# Patient Record
Sex: FEMALE | Race: WHITE | URBAN - METROPOLITAN AREA
[De-identification: names, ages, dates, MRNs, and addresses within clinical notes are randomized per-mention and may not be internally consistent; named-entity substitution may affect disease eponyms.]

---

## 2018-03-16 RX ORDER — PHENYLEPHRINE HYDROCHLORIDE 25 MG/ML
1 SOLUTION/ DROPS OPHTHALMIC ONCE
Status: CANCELLED | OUTPATIENT
Start: 2018-03-16 | End: 2018-03-16

## 2018-06-19 RX ORDER — FLUTICASONE PROPIONATE 50 MCG
1 SPRAY, SUSPENSION (ML) NASAL DAILY
COMMUNITY

## 2018-06-19 RX ORDER — ELECTROLYTES/DEXTROSE
1 SOLUTION, ORAL ORAL DAILY
COMMUNITY

## 2018-06-19 RX ORDER — TIMOLOL MALEATE 5 MG/ML
1 SOLUTION/ DROPS OPHTHALMIC 2 TIMES DAILY
COMMUNITY

## 2018-06-19 RX ORDER — NAPROXEN SODIUM 220 MG/1
1 TABLET ORAL DAILY
COMMUNITY

## 2018-06-19 RX ORDER — FLUOCINONIDE TOPICAL SOLUTION USP, 0.05% 0.5 MG/ML
SOLUTION TOPICAL 2 TIMES DAILY
COMMUNITY

## 2018-06-20 ENCOUNTER — ANESTHESIA EVENT (OUTPATIENT)
Dept: SURGERY | Facility: CLINIC | Age: 68
End: 2018-06-20
Payer: MEDICARE

## 2018-06-20 ENCOUNTER — HOSPITAL ENCOUNTER (OUTPATIENT)
Facility: CLINIC | Age: 68
Discharge: HOME | End: 2018-06-20
Attending: OPHTHALMOLOGY | Admitting: OPHTHALMOLOGY
Payer: MEDICARE

## 2018-06-20 ENCOUNTER — ANESTHESIA (OUTPATIENT)
Dept: SURGERY | Facility: CLINIC | Age: 68
End: 2018-06-20
Payer: MEDICARE

## 2018-06-20 VITALS
BODY MASS INDEX: 26.68 KG/M2 | HEIGHT: 62 IN | RESPIRATION RATE: 14 BRPM | WEIGHT: 145 LBS | OXYGEN SATURATION: 99 % | SYSTOLIC BLOOD PRESSURE: 135 MMHG | DIASTOLIC BLOOD PRESSURE: 81 MMHG | TEMPERATURE: 97 F

## 2018-06-20 DIAGNOSIS — H50.011 ESOTROPIA OF RIGHT EYE: Primary | ICD-10-CM

## 2018-06-20 PROCEDURE — 25000125 ZZHC RX 250: Performed by: NURSE ANESTHETIST, CERTIFIED REGISTERED

## 2018-06-20 PROCEDURE — 71000004 ZZH RECOVERY EYE PHASE 1 LEVEL 1 FIRST HR: Performed by: OPHTHALMOLOGY

## 2018-06-20 PROCEDURE — 25000128 H RX IP 250 OP 636: Performed by: ANESTHESIOLOGY

## 2018-06-20 PROCEDURE — 36000101 ZZH EYE SURGERY LEVEL 3 1ST 30 MIN: Performed by: OPHTHALMOLOGY

## 2018-06-20 PROCEDURE — 25000125 ZZHC RX 250: Performed by: ANESTHESIOLOGY

## 2018-06-20 PROCEDURE — 37000009 ZZH ANESTHESIA TECHNICAL FEE, EACH ADDTL 15 MIN: Performed by: OPHTHALMOLOGY

## 2018-06-20 PROCEDURE — 40000170 ZZH STATISTIC PRE-PROCEDURE ASSESSMENT II: Performed by: OPHTHALMOLOGY

## 2018-06-20 PROCEDURE — 25000128 H RX IP 250 OP 636: Performed by: NURSE ANESTHETIST, CERTIFIED REGISTERED

## 2018-06-20 PROCEDURE — 25000125 ZZHC RX 250: Performed by: OPHTHALMOLOGY

## 2018-06-20 PROCEDURE — 36000102 ZZH EYE SURGERY LEVEL 3 EA 15 ADDTL MIN: Performed by: OPHTHALMOLOGY

## 2018-06-20 PROCEDURE — 25000566 ZZH SEVOFLURANE, EA 15 MIN: Performed by: OPHTHALMOLOGY

## 2018-06-20 PROCEDURE — 37000008 ZZH ANESTHESIA TECHNICAL FEE, 1ST 30 MIN: Performed by: OPHTHALMOLOGY

## 2018-06-20 PROCEDURE — 27210794 ZZH OR GENERAL SUPPLY STERILE: Performed by: OPHTHALMOLOGY

## 2018-06-20 PROCEDURE — 71000028 ZZH EYE RECOVERY PHASE 2 EACH 15 MINS: Performed by: OPHTHALMOLOGY

## 2018-06-20 PROCEDURE — 25000128 H RX IP 250 OP 636: Performed by: OPHTHALMOLOGY

## 2018-06-20 RX ORDER — MEPERIDINE HYDROCHLORIDE 25 MG/ML
12.5 INJECTION INTRAMUSCULAR; INTRAVENOUS; SUBCUTANEOUS
Status: CANCELLED | OUTPATIENT
Start: 2018-06-20

## 2018-06-20 RX ORDER — SODIUM CHLORIDE, SODIUM LACTATE, POTASSIUM CHLORIDE, CALCIUM CHLORIDE 600; 310; 30; 20 MG/100ML; MG/100ML; MG/100ML; MG/100ML
INJECTION, SOLUTION INTRAVENOUS CONTINUOUS
Status: CANCELLED | OUTPATIENT
Start: 2018-06-20

## 2018-06-20 RX ORDER — PROPOFOL 10 MG/ML
INJECTION, EMULSION INTRAVENOUS PRN
Status: DISCONTINUED | OUTPATIENT
Start: 2018-06-20 | End: 2018-06-20

## 2018-06-20 RX ORDER — NALOXONE HYDROCHLORIDE 0.4 MG/ML
.1-.4 INJECTION, SOLUTION INTRAMUSCULAR; INTRAVENOUS; SUBCUTANEOUS
Status: CANCELLED | OUTPATIENT
Start: 2018-06-20 | End: 2018-06-21

## 2018-06-20 RX ORDER — HYDROMORPHONE HYDROCHLORIDE 1 MG/ML
.3-.5 INJECTION, SOLUTION INTRAMUSCULAR; INTRAVENOUS; SUBCUTANEOUS EVERY 10 MIN PRN
Status: CANCELLED | OUTPATIENT
Start: 2018-06-20

## 2018-06-20 RX ORDER — LIDOCAINE HYDROCHLORIDE 20 MG/ML
INJECTION, SOLUTION INFILTRATION; PERINEURAL PRN
Status: DISCONTINUED | OUTPATIENT
Start: 2018-06-20 | End: 2018-06-20

## 2018-06-20 RX ORDER — ONDANSETRON 2 MG/ML
4 INJECTION INTRAMUSCULAR; INTRAVENOUS EVERY 30 MIN PRN
Status: CANCELLED | OUTPATIENT
Start: 2018-06-20

## 2018-06-20 RX ORDER — SODIUM CHLORIDE, SODIUM LACTATE, POTASSIUM CHLORIDE, CALCIUM CHLORIDE 600; 310; 30; 20 MG/100ML; MG/100ML; MG/100ML; MG/100ML
INJECTION, SOLUTION INTRAVENOUS CONTINUOUS
Status: DISCONTINUED | OUTPATIENT
Start: 2018-06-20 | End: 2018-06-20 | Stop reason: HOSPADM

## 2018-06-20 RX ORDER — DEXAMETHASONE SODIUM PHOSPHATE 4 MG/ML
INJECTION, SOLUTION INTRA-ARTICULAR; INTRALESIONAL; INTRAMUSCULAR; INTRAVENOUS; SOFT TISSUE PRN
Status: DISCONTINUED | OUTPATIENT
Start: 2018-06-20 | End: 2018-06-20

## 2018-06-20 RX ORDER — NEOMYCIN POLYMYXIN B SULFATES AND DEXAMETHASONE 3.5; 10000; 1 MG/ML; [USP'U]/ML; MG/ML
1 SUSPENSION/ DROPS OPHTHALMIC 3 TIMES DAILY
Qty: 1 BOTTLE | Refills: 1 | Status: SHIPPED | OUTPATIENT
Start: 2018-06-20 | End: 2018-06-27

## 2018-06-20 RX ORDER — NEOMYCIN SULFATE, POLYMYXIN B SULFATE AND DEXAMETHASONE 3.5; 10000; 1 MG/ML; [USP'U]/ML; MG/ML
SUSPENSION/ DROPS OPHTHALMIC PRN
Status: DISCONTINUED | OUTPATIENT
Start: 2018-06-20 | End: 2018-06-20 | Stop reason: HOSPADM

## 2018-06-20 RX ORDER — ONDANSETRON 2 MG/ML
INJECTION INTRAMUSCULAR; INTRAVENOUS PRN
Status: DISCONTINUED | OUTPATIENT
Start: 2018-06-20 | End: 2018-06-20

## 2018-06-20 RX ORDER — HYDROCODONE BITARTRATE AND ACETAMINOPHEN 5; 325 MG/1; MG/1
1 TABLET ORAL EVERY 4 HOURS PRN
Qty: 12 TABLET | Refills: 0 | Status: SHIPPED | OUTPATIENT
Start: 2018-06-20

## 2018-06-20 RX ORDER — FENTANYL CITRATE 50 UG/ML
25-50 INJECTION, SOLUTION INTRAMUSCULAR; INTRAVENOUS
Status: CANCELLED | OUTPATIENT
Start: 2018-06-20

## 2018-06-20 RX ORDER — BALANCED SALT SOLUTION 6.4; .75; .48; .3; 3.9; 1.7 MG/ML; MG/ML; MG/ML; MG/ML; MG/ML; MG/ML
SOLUTION OPHTHALMIC PRN
Status: DISCONTINUED | OUTPATIENT
Start: 2018-06-20 | End: 2018-06-20 | Stop reason: HOSPADM

## 2018-06-20 RX ORDER — FENTANYL CITRATE 50 UG/ML
INJECTION, SOLUTION INTRAMUSCULAR; INTRAVENOUS PRN
Status: DISCONTINUED | OUTPATIENT
Start: 2018-06-20 | End: 2018-06-20

## 2018-06-20 RX ORDER — PROPOFOL 10 MG/ML
INJECTION, EMULSION INTRAVENOUS CONTINUOUS PRN
Status: DISCONTINUED | OUTPATIENT
Start: 2018-06-20 | End: 2018-06-20

## 2018-06-20 RX ORDER — HYDROCODONE BITARTRATE AND ACETAMINOPHEN 5; 325 MG/1; MG/1
1 TABLET ORAL EVERY 6 HOURS PRN
Qty: 10 TABLET | Refills: 0 | Status: SHIPPED | OUTPATIENT
Start: 2018-06-20

## 2018-06-20 RX ORDER — DEXAMETHASONE SODIUM PHOSPHATE 4 MG/ML
INJECTION, SOLUTION INTRA-ARTICULAR; INTRALESIONAL; INTRAMUSCULAR; INTRAVENOUS; SOFT TISSUE PRN
Status: DISCONTINUED | OUTPATIENT
Start: 2018-06-20 | End: 2018-06-20 | Stop reason: HOSPADM

## 2018-06-20 RX ORDER — PHENYLEPHRINE HYDROCHLORIDE 25 MG/ML
SOLUTION/ DROPS OPHTHALMIC PRN
Status: DISCONTINUED | OUTPATIENT
Start: 2018-06-20 | End: 2018-06-20 | Stop reason: HOSPADM

## 2018-06-20 RX ORDER — ONDANSETRON 4 MG/1
4 TABLET, ORALLY DISINTEGRATING ORAL EVERY 30 MIN PRN
Status: CANCELLED | OUTPATIENT
Start: 2018-06-20

## 2018-06-20 RX ADMIN — PROPOFOL 100 MCG/KG/MIN: 10 INJECTION, EMULSION INTRAVENOUS at 13:01

## 2018-06-20 RX ADMIN — LIDOCAINE HYDROCHLORIDE 60 MG: 20 INJECTION, SOLUTION INFILTRATION; PERINEURAL at 12:58

## 2018-06-20 RX ADMIN — SODIUM CHLORIDE, POTASSIUM CHLORIDE, SODIUM LACTATE AND CALCIUM CHLORIDE: 600; 310; 30; 20 INJECTION, SOLUTION INTRAVENOUS at 11:36

## 2018-06-20 RX ADMIN — LIDOCAINE HYDROCHLORIDE 1 ML: 10 INJECTION, SOLUTION EPIDURAL; INFILTRATION; INTRACAUDAL; PERINEURAL at 11:35

## 2018-06-20 RX ADMIN — ONDANSETRON 4 MG: 2 INJECTION INTRAMUSCULAR; INTRAVENOUS at 15:07

## 2018-06-20 RX ADMIN — DEXAMETHASONE SODIUM PHOSPHATE 4 MG: 4 INJECTION, SOLUTION INTRA-ARTICULAR; INTRALESIONAL; INTRAMUSCULAR; INTRAVENOUS; SOFT TISSUE at 13:25

## 2018-06-20 RX ADMIN — ONDANSETRON 4 MG: 2 INJECTION INTRAMUSCULAR; INTRAVENOUS at 12:58

## 2018-06-20 RX ADMIN — MIDAZOLAM 2 MG: 1 INJECTION INTRAMUSCULAR; INTRAVENOUS at 12:57

## 2018-06-20 RX ADMIN — FENTANYL CITRATE 50 MCG: 50 INJECTION, SOLUTION INTRAMUSCULAR; INTRAVENOUS at 12:58

## 2018-06-20 RX ADMIN — PROPOFOL 120 MG: 10 INJECTION, EMULSION INTRAVENOUS at 12:58

## 2018-06-20 ASSESSMENT — ENCOUNTER SYMPTOMS: SEIZURES: 0

## 2018-06-20 ASSESSMENT — COPD QUESTIONNAIRES: COPD: 0

## 2018-06-20 NOTE — ANESTHESIA CARE TRANSFER NOTE
Patient: Clementine Quick    Procedure(s):  RIGHT EYE STRABISMUS REPAIR WITH ADJUSTABLE SUTURE - Wound Class: I-Clean    Diagnosis: RIGHT EYE MENOCCULAR ESOTROPIA  Diagnosis Additional Information: No value filed.    Anesthesia Type:   General, LMA     Note:  Airway :Face Mask  Patient transferred to:PACU  Comments: Transferred to [facility] recovery room in recliner with armrests up, spontaneous respirations, O2 saturation maintained greater than 98% with oxygen via facemask. All monitors and alarms on and functioning, clinically stable vital signs. Report given to recovery RN and questions answered. Patient alert and following verbal directions.Handoff Report: Identifed the Patient, Identified the Reponsible Provider, Reviewed the pertinent medical history, Discussed the surgical course, Reviewed Intra-OP anesthesia mangement and issues during anesthesia, Set expectations for post-procedure period and Allowed opportunity for questions and acknowledgement of understanding      Vitals: (Last set prior to Anesthesia Care Transfer)    CRNA VITALS  6/20/2018 1325 - 6/20/2018 1400      6/20/2018             NIBP: (!)  126/98    Pulse: 107    NIBP Mean: 112    SpO2: 99 %    Resp Rate (observed): 10                Electronically Signed By: NILSA Ray CRNA  June 20, 2018  2:00 PM

## 2018-06-20 NOTE — DISCHARGE INSTRUCTIONS
[facility] Discharge  Instructions  Anesthesia ([facility])   Adult Discharge Instructions (General)    For 24 hours after surgery    1. Get plenty of rest.  Make arrangements to have a responsible adult stay with you for at least 24 hours.  2. Do not drive or use heavy equipment for 24 hours.    3. Do not drink alcohol for 24 hours.  4. Do not sign legal documents or make important decisions for 24 hours.  5. Avoid strenuous or risky activities. You may feel lightheaded.  If so, sit for a few minutes before standing.  Have someone help you get up.   6. General anesthesia patients should drink only clear liquids (apple juice, ginger ale, broth or 7-Up). Be sure to drink enough fluids.  Move to a regular diet as you feel able.  7. Any questions of medical nature, call your physician.    [facility]  Discharge Instructions after Strabismus Repair-Adult  Rashard Espinosa M.D.          You may be nauseated after surgery.  Lying down or sitting quietly often helps to relieve this.      Try sips of clear liquids or popsicles frequently for the first few hours after arriving home.  If you tolerate liquids and are not nauseated, add soft and bland foods to the diet.  Advance to regular diet as tolerated.  Return to clear liquids if you cannot tolerate solid foods.      Avoid rubbing the eyes.  Eye redness is normal and typically is worse the        second day after surgery. This gradually disappears in one to two weeks.       Do not use tap water near the eyes for one week.  Use a dry cloth or sterile saline from the pharmacy, if needed.      Restrict activity for three days.  NO heavy lifting (greater than 20 pounds) or swimming for one week (or until approved by your doctor).      Maxitrol drops/ointment has been prescribed.  Starting tomorrow morning, apply as directed three times a day until further notice.    Vicodin or other oral pain medications have been  written.  Most patients do not need to fill this prescriptions.  However, take the prescription home and fill if necessary.    Your post-operative appointment is typically in 1-2 days if adjustable sutures were used. If not, the appointment is usually within two weeks.      [facility] Phone Number: [phone]

## 2018-06-20 NOTE — IP AVS SNAPSHOT
MRN:[id number]                      After Visit Summary   6/20/2018    Clementine Quick    MRN: [id number]           Thank you!     Thank you for choosing [facility] for your care. Our goal is always to provide you with excellent care. Hearing back from our patients is one way we can continue to improve our services. Please take a few minutes to complete the written survey that you may receive in the mail after you visit with us. Thank you!        Patient Information     Date Of Birth          1950        About your hospital stay     You were admitted on:  June 20, 2018 You last received care in the:  [facility]    You were discharged on:  June 20, 2018       Who to Call     For medical emergencies, please call 911.  For non-urgent questions about your medical care, please call your primary care provider or clinic, [phone]  For questions related to your surgery, please call your surgery clinic        Attending Provider     Provider Specialty    Rashard Espinosa MD Ophthalmology       Primary Care Provider Office Phone # Fax #    Nas Barcenas [phone]      After Care Instructions     Eye drops as prescribed by physician.  Start drops today unless told otherwise by the physician           May use Tylenol or Advil for pain as directed by the physician           Notify Physician if you have severe headache or nausea           Remove patch per physician instruction           Return to clinic as instructed by physician           Wear eye shield or patch as directed                 Further instructions from your care team       [facility] Discharge  Instructions  Anesthesia ([facility])   Adult Discharge Instructions (General)    For 24 hours after surgery    1. Get plenty of rest.  Make arrangements to have a responsible adult stay with you for at least 24 hours.  2. Do not drive or use heavy equipment for 24  hours.    3. Do not drink alcohol for 24 hours.  4. Do not sign legal documents or make important decisions for 24 hours.  5. Avoid strenuous or risky activities. You may feel lightheaded.  If so, sit for a few minutes before standing.  Have someone help you get up.   6. General anesthesia patients should drink only clear liquids (apple juice, ginger ale, broth or 7-Up). Be sure to drink enough fluids.  Move to a regular diet as you feel able.  7. Any questions of medical nature, call your physician.    [facility] [place]  Discharge Instructions after Strabismus Repair-Adult  Rashard Espinosa M.D.          You may be nauseated after surgery.  Lying down or sitting quietly often helps to relieve this.      Try sips of clear liquids or popsicles frequently for the first few hours after arriving home.  If you tolerate liquids and are not nauseated, add soft and bland foods to the diet.  Advance to regular diet as tolerated.  Return to clear liquids if you cannot tolerate solid foods.      Avoid rubbing the eyes.  Eye redness is normal and typically is worse the        second day after surgery. This gradually disappears in one to two weeks.       Do not use tap water near the eyes for one week.  Use a dry cloth or sterile saline from the pharmacy, if needed.      Restrict activity for three days.  NO heavy lifting (greater than 20 pounds) or swimming for one week (or until approved by your doctor).      Maxitrol drops/ointment has been prescribed.  Starting tomorrow morning, apply as directed three times a day until further notice.    Vicodin or other oral pain medications have been written.  Most patients do not need to fill this prescriptions.  However, take the prescription home and fill if necessary.    Your post-operative appointment is typically in 1-2 days if adjustable sutures were used. If not, the appointment is usually within two weeks.      [facility] Phone Number: [phone]         "                                                                                                                        Pending Results     No orders found from 2018 to 2018.            Admission Information     Date & Time Provider Department Dept. Phone    2018 Rashard Espinosa MD [facility] [phone]      Your Vitals Were     Blood Pressure Temperature Respirations Height Weight Pulse Oximetry    135/81 97  F (36.1  C) (Temporal) 14 1.575 m (5' 2\") 65.8 kg (145 lb) 99%    BMI (Body Mass Index)                   26.52 kg/m2           [employer] Information     [employer] lets you send messages to your doctor, view your test results, renew your prescriptions, schedule appointments and more. To sign up, go to [email or web address] . Click on \"Log in\" on the left side of the screen, which will take you to the Welcome page. Then click on \"Sign up Now\" on the right side of the page.     You will be asked to enter the access code listed below, as well as some personal information. Please follow the directions to create your username and password.     Your access code is: [email or web address]  Expires: 2018  2:48 PM     Your access code will  in 90 days. If you need help or a new code, please call your [place] clinic or [phone].        Care EveryWhere ID     This is your Care EveryWhere ID. This could be used by other organizations to access your [place] medical records  [id number]        Equal Access to Services     RENÉ CHEN AH: Hadii mei starro Sogary, waaxda luqadaha, qaybta kaalmada adeegyada, devendra fitzpatrick. So [facility] [phone].    ATENCIÓN: Si habla español, tiene a hinojosa disposición servicios gratuitos de asistencia lingüística. Llame al [phone].    We comply with applicable federal civil rights laws and [place] laws. We do not discriminate on the basis of race, color, national origin, age, disability, sex, sexual orientation, " or gender identity.               Review of your medicines      START taking        Dose / Directions    * HYDROcodone-acetaminophen 5-325 MG per tablet   Commonly known as:  NORCO   Used for:  Esotropia of right eye        Dose:  1 tablet   Take 1 tablet by mouth every 4 hours as needed for pain   Quantity:  12 tablet   Refills:  0       * HYDROcodone-acetaminophen 5-325 MG per tablet   Commonly known as:  NORCO   Used for:  Esotropia of right eye        Dose:  1 tablet   Take 1 tablet by mouth every 6 hours as needed for severe pain   Quantity:  10 tablet   Refills:  0       * neomycin-polymixin-dexamethasone ophthalmic suspension   Commonly known as:  MAXITROL   Used for:  Esotropia of right eye        Dose:  1 drop   Apply 1 drop to eye 3 times daily for 7 days   Quantity:  1 Bottle   Refills:  1       * neomycin-polymixin-dexamethasone ophthalmic suspension   Commonly known as:  MAXITROL   Used for:  Esotropia of right eye        Dose:  1 drop   Apply 1 drop to eye 3 times daily for 7 days   Quantity:  1 Bottle   Refills:  1       * Notice:  This list has 4 medication(s) that are the same as other medications prescribed for you. Read the directions carefully, and ask your doctor or other care provider to review them with you.      CONTINUE these medicines which have NOT CHANGED        Dose / Directions    AMBIEN PO        Dose:  10 mg   Take 10 mg by mouth nightly as needed for sleep   Refills:  0       Calcium + D3 600-200 MG-UNIT Tabs        Take by mouth daily   Refills:  0       EQL OMEGA 3 FISH OIL 1000 MG Caps        Dose:  1 capsule   Take 1 capsule by mouth daily   Refills:  0       fluocinonide 0.05 % solution   Commonly known as:  LIDEX        Apply topically 2 times daily   Refills:  0       fluticasone 50 MCG/ACT spray   Commonly known as:  FLONASE        Dose:  1 spray   Spray 1 spray into both nostrils daily   Refills:  0       HYDROCHLOROTHIAZIDE PO        Dose:  25 mg   Take 25 mg by mouth daily    Refills:  0       LISINOPRIL PO        Dose:  5 mg   Take 5 mg by mouth daily   Refills:  0       MULTIVITAMIN ADULT Tabs        Dose:  1 tablet   Take 1 tablet by mouth daily Take with food   Refills:  0       timolol 0.5 % ophthalmic solution   Commonly known as:  TIMOPTIC        Dose:  1 drop   1 drop 2 times daily   Refills:  0            Where to get your medicines      Some of these will need a paper prescription and others can be bought over the counter. Ask your nurse if you have questions.     Bring a paper prescription for each of these medications     HYDROcodone-acetaminophen 5-325 MG per tablet    HYDROcodone-acetaminophen 5-325 MG per tablet    neomycin-polymixin-dexamethasone ophthalmic suspension    neomycin-polymixin-dexamethasone ophthalmic suspension                Protect others around you: Learn how to safely use, store and throw away your medicines at www.disposemymeds.org.        Information about OPIOIDS     PRESCRIPTION OPIOIDS: WHAT YOU NEED TO KNOW   We gave you an opioid (narcotic) pain medicine. It is important to manage your pain, but opioids are not always the best choice. You should first try all the other options your care team gave you. Take this medicine for as short a time (and as few doses) as possible.     These medicines have risks:    DO NOT drive when on new or higher doses of pain medicine. These medicines can affect your alertness and reaction times, and you could be arrested for driving under the influence (DUI). If you need to use opioids long-term, talk to your care team about driving.    DO NOT operate heave machinery    DO NOT do any other dangerous activities while taking these medicines.     DO NOT drink any alcohol while taking these medicines.      If the opioid prescribed includes acetaminophen, DO NOT take with any other medicines that contain acetaminophen. Read all labels carefully. Look for the word  acetaminophen  or  Tylenol.  Ask your pharmacist if you  have questions or are unsure.    You can get addicted to pain medicines, especially if you have a history of addiction (chemical, alcohol or substance dependence). Talk to your care team about ways to reduce this risk.    Store your pills in a secure place, locked if possible. We will not replace any lost or stolen medicine. If you don t finish your medicine, please throw away (dispose) as directed by your pharmacist. The [place] Pollution Control Agency has more information about safe disposal: https://www.pca.[facility].mn.us/living-green/managing-unwanted-medications.     All opioids tend to cause constipation. Drink plenty of water and eat foods that have a lot of fiber, such as fruits, vegetables, prune juice, apple juice and high-fiber cereal. Take a laxative (Miralax, milk of magnesia, Colace, Senna) if you don t move your bowels at least every other day.              Medication List: This is a list of all your medications and when to take them. Check marks below indicate your daily home schedule. Keep this list as a reference.      Medications           Morning Afternoon Evening Bedtime As Needed    AMBIEN PO   Take 10 mg by mouth nightly as needed for sleep                                Calcium + D3 600-200 MG-UNIT Tabs   Take by mouth daily                                EQL OMEGA 3 FISH OIL 1000 MG Caps   Take 1 capsule by mouth daily                                fluocinonide 0.05 % solution   Commonly known as:  LIDEX   Apply topically 2 times daily                                fluticasone 50 MCG/ACT spray   Commonly known as:  FLONASE   Spray 1 spray into both nostrils daily                                HYDROCHLOROTHIAZIDE PO   Take 25 mg by mouth daily                                * HYDROcodone-acetaminophen 5-325 MG per tablet   Commonly known as:  NORCO   Take 1 tablet by mouth every 4 hours as needed for pain                                * HYDROcodone-acetaminophen 5-325 MG per tablet    Commonly known as:  NORCO   Take 1 tablet by mouth every 6 hours as needed for severe pain                                LISINOPRIL PO   Take 5 mg by mouth daily                                MULTIVITAMIN ADULT Tabs   Take 1 tablet by mouth daily Take with food                                * neomycin-polymixin-dexamethasone ophthalmic suspension   Commonly known as:  MAXITROL   Apply 1 drop to eye 3 times daily for 7 days   Last time this was given:  1 drop on 6/20/2018  1:47 PM                                * neomycin-polymixin-dexamethasone ophthalmic suspension   Commonly known as:  MAXITROL   Apply 1 drop to eye 3 times daily for 7 days   Last time this was given:  1 drop on 6/20/2018  1:47 PM                                timolol 0.5 % ophthalmic solution   Commonly known as:  TIMOPTIC   1 drop 2 times daily                                * Notice:  This list has 4 medication(s) that are the same as other medications prescribed for you. Read the directions carefully, and ask your doctor or other care provider to review them with you.

## 2018-06-20 NOTE — ANESTHESIA POSTPROCEDURE EVALUATION
Patient: Clementine Quick    Procedure(s):  RIGHT EYE STRABISMUS REPAIR WITH ADJUSTABLE SUTURE - Wound Class: I-Clean    Diagnosis:RIGHT EYE MENOCCULAR ESOTROPIA  Diagnosis Additional Information: No value filed.    Anesthesia Type:  General, LMA    Note:  Anesthesia Post Evaluation    Patient location during evaluation: PACU  Patient participation: Able to fully participate in evaluation  Level of consciousness: awake, awake and alert and responsive to verbal stimuli  Pain management: adequate  Airway patency: patent  Cardiovascular status: acceptable  Respiratory status: acceptable  Hydration status: acceptable  PONV: none     Anesthetic complications: None          Last vitals:  Vitals:    06/20/18 1420 06/20/18 1430 06/20/18 1457   BP: (!) 140/108 (!) 148/111 135/81   Resp: 16 13 14   Temp: 36.1  C (97  F)     SpO2: 99% 100% 99%         Electronically Signed By: Ritika Salmon  June 20, 2018  4:08 PM
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
## 2018-06-20 NOTE — OR NURSING
Dr. Salmon notified of hypertension 140s/110s consistent in both arms.  States she didn't take her Lisinopril this am.  Plan to continue to monitor.

## 2018-06-20 NOTE — BRIEF OP NOTE
[facility] Brief Operative Note    Pre-operative diagnosis: RIGHT EYE MONOCULAR ESOTROPIA   Post-operative diagnosis esotropia     Procedure: Procedure(s):  RIGHT EYE STRABISMUS REPAIR WITH ADJUSTABLE SUTURE - Wound Class: I-Clean   Surgeon(s): Surgeon(s) and Role:     * Rashard Espinosa MD - Primary   Estimated blood loss: * No values recorded between 6/20/2018  1:07 PM and 6/20/2018  1:57 PM *    Specimens: * No specimens in log *   Findings: Scarring of RMR and RLR to scleral buckle hardware, esotropia

## 2018-06-20 NOTE — ANESTHESIA PREPROCEDURE EVALUATION
Procedure: Procedure(s):  RECESSION RESECTION WITH ADJUSTABLE SUTURE  Preop diagnosis: RIGHT EYE MENOCCULAR ESOTROPIA    No Known Allergies  Past Medical History:   Diagnosis Date     Arthritis      Colon polyp      HTN (hypertension)      Hyperlipidemia      Hyperlipidemia      Hypertension      Osteopenia      PONV (postoperative nausea and vomiting)      Strabismus      Past Surgical History:   Procedure Laterality Date      SECTION       left cataract       MAMMOPLASTY REDUCTION       RELEASE CARPAL TUNNEL       Social History   Substance Use Topics     Smoking status: Never Smoker     Smokeless tobacco: Never Used     Alcohol use Yes      Comment: rare     Prior to Admission medications    Medication Sig Start Date End Date Taking? Authorizing Provider   Calcium Carb-Cholecalciferol (CALCIUM + D3) 600-200 MG-UNIT TABS Take by mouth daily   Yes Reported, Patient   fluocinonide (LIDEX) 0.05 % solution Apply topically 2 times daily   Yes Reported, Patient   fluticasone (FLONASE) 50 MCG/ACT spray Spray 1 spray into both nostrils daily   Yes Reported, Patient   HYDROCHLOROTHIAZIDE PO Take 25 mg by mouth daily   Yes Reported, Patient   LISINOPRIL PO Take 5 mg by mouth daily   Yes Reported, Patient   Multiple Vitamins-Minerals (MULTIVITAMIN ADULT) TABS Take 1 tablet by mouth daily Take with food   Yes Reported, Patient   Omega-3 Fatty Acids (EQL OMEGA 3 FISH OIL) 1000 MG CAPS Take 1 capsule by mouth daily   Yes Reported, Patient   timolol (TIMOPTIC) 0.5 % ophthalmic solution 1 drop 2 times daily   Yes Reported, Patient   Zolpidem Tartrate (AMBIEN PO) Take 10 mg by mouth nightly as needed for sleep   Yes Reported, Patient     Current Facility-Administered Medications Ordered in Epic   Medication Dose Route Frequency Last Rate Last Dose     lactated ringers infusion   Intravenous Continuous         lidocaine 1 % 1 mL  1 mL Other Q1H PRN         No current [facility]-ordered outpatient prescriptions on file.        lactated ringers       Wt Readings from Last 1 Encounters:   06/19/18 65.8 kg (145 lb)     Temp Readings from Last 1 Encounters:   No data found for Temp     BP Readings from Last 6 Encounters:   No data found for BP     Pulse Readings from Last 4 Encounters:   No data found for Pulse     Resp Readings from Last 1 Encounters:   No data found for Resp     SpO2 Readings from Last 1 Encounters:   No data found for SpO2         Anesthesia Evaluation     . Pt has had prior anesthetic.     History of anesthetic complications   - PONV        ROS/MED HX    ENT/Pulmonary:      (-) asthma, COPD and sleep apnea   Neurologic:      (-) seizures and CVA   Cardiovascular:     (+) Dyslipidemia, hypertension----. : . . . :. .       METS/Exercise Tolerance:     Hematologic:         Musculoskeletal:         GI/Hepatic:     (+) GERD (occasionally )      (-) liver disease   Renal/Genitourinary:      (-) renal disease   Endo:         Psychiatric:         Infectious Disease:         Malignancy:         Other:                     Physical Exam      Airway   Mallampati: II  Neck ROM: full    Dental   (+) implants and caps    Cardiovascular       Pulmonary                     Anesthesia Plan      History & Physical Review  History and physical reviewed and following examination; no interval change.    ASA Status:  2 .    NPO Status:  > 8 hours    Plan for General and LMA   PONV prophylaxis:  Ondansetron (or other 5HT-3) and Dexamethasone or Solumedrol  Propofol gtt       Postoperative Care      Consents  Anesthetic plan, risks, benefits and alternatives discussed with:  Patient..                          .

## 2018-06-20 NOTE — IP AVS SNAPSHOT
[facility]    [address]    [place] [address]    Phone:  [phone]    Fax:  [phone]                                       After Visit Summary   6/20/2018    Clementine Quick    MRN: [id number]           After Visit Summary Signature Page     I have received my discharge instructions, and my questions have been answered. I have discussed any challenges I see with this plan with the nurse or doctor.    ..........................................................................................................................................  Patient/Patient Representative Signature      ..........................................................................................................................................  Patient Representative Print Name and Relationship to Patient    ..................................................               ................................................  Date                                            Time    ..........................................................................................................................................  Reviewed by Signature/Title    ...................................................              ..............................................  Date                                                            Time

## 2021-05-26 ENCOUNTER — RECORDS - HEALTHEAST (OUTPATIENT)
Dept: ADMINISTRATIVE | Facility: CLINIC | Age: 71
End: 2021-05-26

## (undated) DEVICE — DRAPE STERI INCISE 24X14" 1040

## (undated) DEVICE — PACK OCULOPLATIC SEN15OCFSD

## (undated) DEVICE — GLOVE PROTEXIS MICRO 7.0  2D73PM70

## (undated) DEVICE — SU CHROMIC 7-0 TG100-8 18" 1744G

## (undated) DEVICE — SOL WATER IRRIG 1000ML BOTTLE 2F7114

## (undated) DEVICE — SYR 01ML 27GA 0.5" NDL TBC 309623

## (undated) DEVICE — SU VICRYL 6-0 S-29 12" J556G

## (undated) DEVICE — EYE PREP BETADINE 5% SOLUTION 30ML 0065-0411-30

## (undated) DEVICE — SU SILK 6-0 G-1DA 18" 780G

## (undated) DEVICE — GLOVE PROTEXIS MICRO 6.5  2D73PM65

## (undated) DEVICE — LINEN TOWEL PACK X5 5464

## (undated) RX ORDER — PROPOFOL 10 MG/ML
INJECTION, EMULSION INTRAVENOUS
Status: DISPENSED
Start: 2018-06-20

## (undated) RX ORDER — LIDOCAINE HYDROCHLORIDE 20 MG/ML
INJECTION, SOLUTION EPIDURAL; INFILTRATION; INTRACAUDAL; PERINEURAL
Status: DISPENSED
Start: 2018-06-20

## (undated) RX ORDER — NEOMYCIN SULFATE, POLYMYXIN B SULFATE AND DEXAMETHASONE 3.5; 10000; 1 MG/ML; [USP'U]/ML; MG/ML
SUSPENSION/ DROPS OPHTHALMIC
Status: DISPENSED
Start: 2018-06-20

## (undated) RX ORDER — PHENYLEPHRINE HYDROCHLORIDE 25 MG/ML
SOLUTION/ DROPS OPHTHALMIC
Status: DISPENSED
Start: 2018-06-20

## (undated) RX ORDER — DEXAMETHASONE SODIUM PHOSPHATE 4 MG/ML
INJECTION, SOLUTION INTRA-ARTICULAR; INTRALESIONAL; INTRAMUSCULAR; INTRAVENOUS; SOFT TISSUE
Status: DISPENSED
Start: 2018-06-20

## (undated) RX ORDER — FENTANYL CITRATE 50 UG/ML
INJECTION, SOLUTION INTRAMUSCULAR; INTRAVENOUS
Status: DISPENSED
Start: 2018-06-20

## (undated) RX ORDER — ONDANSETRON 2 MG/ML
INJECTION INTRAMUSCULAR; INTRAVENOUS
Status: DISPENSED
Start: 2018-06-20